# Patient Record
Sex: FEMALE | Race: WHITE | Employment: UNEMPLOYED | URBAN - METROPOLITAN AREA
[De-identification: names, ages, dates, MRNs, and addresses within clinical notes are randomized per-mention and may not be internally consistent; named-entity substitution may affect disease eponyms.]

---

## 2017-03-15 ENCOUNTER — HOSPITAL ENCOUNTER (OUTPATIENT)
Dept: LAB | Age: 77
Discharge: HOME | End: 2017-03-15
Payer: MEDICARE

## 2017-03-15 LAB
FERRITIN SERPL-MCNC: 7 NG/ML (ref 8–388)
FOLATE SERPL-MCNC: >20 NG/ML (ref 3.1–17.5)
IRON SATN MFR SERPL: 20 %
IRON SERPL-MCNC: 80 UG/DL (ref 50–175)
RETICS/RBC NFR AUTO: 0.8 % (ref 0.5–2.3)
TIBC SERPL-MCNC: 397 UG/DL (ref 250–450)
VIT B12 SERPL-MCNC: 363 PG/ML (ref 211–911)

## 2017-03-15 PROCEDURE — 82728 ASSAY OF FERRITIN: CPT | Performed by: FAMILY MEDICINE

## 2017-03-15 PROCEDURE — 82607 VITAMIN B-12: CPT | Performed by: FAMILY MEDICINE

## 2017-03-15 PROCEDURE — 36415 COLL VENOUS BLD VENIPUNCTURE: CPT | Performed by: FAMILY MEDICINE

## 2017-03-15 PROCEDURE — 83540 ASSAY OF IRON: CPT | Performed by: FAMILY MEDICINE

## 2017-03-15 PROCEDURE — 85045 AUTOMATED RETICULOCYTE COUNT: CPT | Performed by: FAMILY MEDICINE

## 2017-07-11 ENCOUNTER — HOSPITAL ENCOUNTER (OUTPATIENT)
Dept: MAMMOGRAPHY | Age: 77
Discharge: HOME | End: 2017-07-11
Attending: FAMILY MEDICINE
Payer: MEDICARE

## 2017-07-11 DIAGNOSIS — Z12.31 VISIT FOR SCREENING MAMMOGRAM: ICD-10-CM

## 2017-07-11 PROCEDURE — 77067 SCR MAMMO BI INCL CAD: CPT

## 2017-07-11 PROCEDURE — 77063 BREAST TOMOSYNTHESIS BI: CPT

## 2017-11-08 ENCOUNTER — HOSPITAL ENCOUNTER (OUTPATIENT)
Dept: LAB | Age: 77
Discharge: HOME | End: 2017-11-08
Payer: MEDICARE

## 2017-11-08 LAB
ALBUMIN SERPL-MCNC: 4 G/DL (ref 3.4–5)
ALBUMIN/GLOB SERPL: 1.3 {RATIO} (ref 0.8–1.7)
ALP SERPL-CCNC: 62 U/L (ref 45–117)
ALT SERPL-CCNC: 17 U/L (ref 13–56)
ANION GAP SERPL CALC-SCNC: 7 MMOL/L (ref 3–18)
AST SERPL-CCNC: 5 U/L (ref 15–37)
BASOPHILS # BLD: 0 K/UL (ref 0–0.1)
BASOPHILS NFR BLD: 1 % (ref 0–2)
BILIRUB SERPL-MCNC: 0.9 MG/DL (ref 0.2–1)
BUN SERPL-MCNC: 17 MG/DL (ref 7–18)
BUN/CREAT SERPL: 25 (ref 12–20)
CALCIUM SERPL-MCNC: 8.8 MG/DL (ref 8.5–10.1)
CHLORIDE SERPL-SCNC: 107 MMOL/L (ref 100–108)
CHOLEST SERPL-MCNC: 192 MG/DL
CO2 SERPL-SCNC: 29 MMOL/L (ref 21–32)
CREAT SERPL-MCNC: 0.69 MG/DL (ref 0.6–1.3)
DIFFERENTIAL METHOD BLD: ABNORMAL
EOSINOPHIL # BLD: 0.1 K/UL (ref 0–0.4)
EOSINOPHIL NFR BLD: 1 % (ref 0–5)
ERYTHROCYTE [DISTWIDTH] IN BLOOD BY AUTOMATED COUNT: 13.9 % (ref 11.6–14.5)
GLOBULIN SER CALC-MCNC: 3.1 G/DL (ref 2–4)
GLUCOSE SERPL-MCNC: 85 MG/DL (ref 74–99)
HCT VFR BLD AUTO: 36.9 % (ref 35–45)
HDLC SERPL-MCNC: 103 MG/DL (ref 40–60)
HDLC SERPL: 1.9 {RATIO} (ref 0–5)
HGB BLD-MCNC: 11.6 G/DL (ref 12–16)
LDLC SERPL CALC-MCNC: 66.8 MG/DL (ref 0–100)
LIPID PROFILE,FLP: ABNORMAL
LYMPHOCYTES # BLD: 1.3 K/UL (ref 0.9–3.6)
LYMPHOCYTES NFR BLD: 35 % (ref 21–52)
MCH RBC QN AUTO: 28 PG (ref 24–34)
MCHC RBC AUTO-ENTMCNC: 31.4 G/DL (ref 31–37)
MCV RBC AUTO: 88.9 FL (ref 74–97)
MONOCYTES # BLD: 0.2 K/UL (ref 0.05–1.2)
MONOCYTES NFR BLD: 6 % (ref 3–10)
NEUTS SEG # BLD: 2.1 K/UL (ref 1.8–8)
NEUTS SEG NFR BLD: 57 % (ref 40–73)
PLATELET # BLD AUTO: 236 K/UL (ref 135–420)
PMV BLD AUTO: 11.7 FL (ref 9.2–11.8)
POTASSIUM SERPL-SCNC: 4.2 MMOL/L (ref 3.5–5.5)
PROT SERPL-MCNC: 7.1 G/DL (ref 6.4–8.2)
RBC # BLD AUTO: 4.15 M/UL (ref 4.2–5.3)
SODIUM SERPL-SCNC: 143 MMOL/L (ref 136–145)
TRIGL SERPL-MCNC: 111 MG/DL (ref ?–150)
TSH SERPL DL<=0.05 MIU/L-ACNC: 2.07 UIU/ML (ref 0.36–3.74)
VLDLC SERPL CALC-MCNC: 22.2 MG/DL
WBC # BLD AUTO: 3.6 K/UL (ref 4.6–13.2)

## 2017-11-08 PROCEDURE — 80053 COMPREHEN METABOLIC PANEL: CPT | Performed by: FAMILY MEDICINE

## 2017-11-08 PROCEDURE — 85025 COMPLETE CBC W/AUTO DIFF WBC: CPT | Performed by: FAMILY MEDICINE

## 2017-11-08 PROCEDURE — 84443 ASSAY THYROID STIM HORMONE: CPT | Performed by: FAMILY MEDICINE

## 2017-11-08 PROCEDURE — 36415 COLL VENOUS BLD VENIPUNCTURE: CPT | Performed by: FAMILY MEDICINE

## 2017-11-08 PROCEDURE — 80061 LIPID PANEL: CPT | Performed by: FAMILY MEDICINE

## 2018-07-12 ENCOUNTER — HOSPITAL ENCOUNTER (OUTPATIENT)
Dept: MAMMOGRAPHY | Age: 78
Discharge: HOME | End: 2018-07-12
Attending: FAMILY MEDICINE
Payer: MEDICARE

## 2018-07-12 DIAGNOSIS — Z12.31 VISIT FOR SCREENING MAMMOGRAM: ICD-10-CM

## 2018-07-12 PROCEDURE — 77063 BREAST TOMOSYNTHESIS BI: CPT

## 2018-08-02 ENCOUNTER — HOSPITAL ENCOUNTER (OUTPATIENT)
Dept: LAB | Age: 78
Discharge: HOME | End: 2018-08-02
Payer: MEDICARE

## 2018-08-02 LAB
25(OH)D3 SERPL-MCNC: 41.2 NG/ML (ref 30–100)
ALBUMIN SERPL-MCNC: 3.7 G/DL (ref 3.4–5)
ALBUMIN/GLOB SERPL: 1.3 {RATIO} (ref 0.8–1.7)
ALP SERPL-CCNC: 51 U/L (ref 45–117)
ALT SERPL-CCNC: 16 U/L (ref 13–56)
ANION GAP SERPL CALC-SCNC: 5 MMOL/L (ref 3–18)
AST SERPL-CCNC: 8 U/L (ref 15–37)
BASOPHILS # BLD: 0 K/UL (ref 0–0.1)
BASOPHILS NFR BLD: 0 % (ref 0–2)
BILIRUB SERPL-MCNC: 0.9 MG/DL (ref 0.2–1)
BUN SERPL-MCNC: 12 MG/DL (ref 7–18)
BUN/CREAT SERPL: 17 (ref 12–20)
CALCIUM SERPL-MCNC: 8.5 MG/DL (ref 8.5–10.1)
CHLORIDE SERPL-SCNC: 110 MMOL/L (ref 100–108)
CHOLEST SERPL-MCNC: 155 MG/DL
CO2 SERPL-SCNC: 31 MMOL/L (ref 21–32)
CREAT SERPL-MCNC: 0.72 MG/DL (ref 0.6–1.3)
DIFFERENTIAL METHOD BLD: ABNORMAL
EOSINOPHIL # BLD: 0.1 K/UL (ref 0–0.4)
EOSINOPHIL NFR BLD: 2 % (ref 0–5)
ERYTHROCYTE [DISTWIDTH] IN BLOOD BY AUTOMATED COUNT: 13.6 % (ref 11.6–14.5)
GLOBULIN SER CALC-MCNC: 2.9 G/DL (ref 2–4)
GLUCOSE SERPL-MCNC: 83 MG/DL (ref 74–99)
HCT VFR BLD AUTO: 34.5 % (ref 35–45)
HDLC SERPL-MCNC: 76 MG/DL (ref 40–60)
HDLC SERPL: 2 {RATIO} (ref 0–5)
HGB BLD-MCNC: 11.1 G/DL (ref 12–16)
LDLC SERPL CALC-MCNC: 61.4 MG/DL (ref 0–100)
LIPID PROFILE,FLP: ABNORMAL
LYMPHOCYTES # BLD: 1.3 K/UL (ref 0.9–3.6)
LYMPHOCYTES NFR BLD: 37 % (ref 21–52)
MCH RBC QN AUTO: 28.5 PG (ref 24–34)
MCHC RBC AUTO-ENTMCNC: 32.2 G/DL (ref 31–37)
MCV RBC AUTO: 88.7 FL (ref 74–97)
MONOCYTES # BLD: 0.2 K/UL (ref 0.05–1.2)
MONOCYTES NFR BLD: 6 % (ref 3–10)
NEUTS SEG # BLD: 1.9 K/UL (ref 1.8–8)
NEUTS SEG NFR BLD: 55 % (ref 40–73)
PLATELET # BLD AUTO: 213 K/UL (ref 135–420)
PMV BLD AUTO: 11.3 FL (ref 9.2–11.8)
POTASSIUM SERPL-SCNC: 4.2 MMOL/L (ref 3.5–5.5)
PROT SERPL-MCNC: 6.6 G/DL (ref 6.4–8.2)
RBC # BLD AUTO: 3.89 M/UL (ref 4.2–5.3)
SODIUM SERPL-SCNC: 146 MMOL/L (ref 136–145)
TRIGL SERPL-MCNC: 88 MG/DL (ref ?–150)
TSH SERPL DL<=0.05 MIU/L-ACNC: 2.49 UIU/ML (ref 0.36–3.74)
VLDLC SERPL CALC-MCNC: 17.6 MG/DL
WBC # BLD AUTO: 3.5 K/UL (ref 4.6–13.2)

## 2018-08-02 PROCEDURE — 80061 LIPID PANEL: CPT | Performed by: FAMILY MEDICINE

## 2018-08-02 PROCEDURE — 82306 VITAMIN D 25 HYDROXY: CPT | Performed by: FAMILY MEDICINE

## 2018-08-02 PROCEDURE — 36415 COLL VENOUS BLD VENIPUNCTURE: CPT | Performed by: FAMILY MEDICINE

## 2018-08-02 PROCEDURE — 84443 ASSAY THYROID STIM HORMONE: CPT | Performed by: FAMILY MEDICINE

## 2018-08-02 PROCEDURE — 80053 COMPREHEN METABOLIC PANEL: CPT | Performed by: FAMILY MEDICINE

## 2018-08-02 PROCEDURE — 85025 COMPLETE CBC W/AUTO DIFF WBC: CPT | Performed by: FAMILY MEDICINE

## 2019-01-16 ENCOUNTER — HOSPITAL ENCOUNTER (OUTPATIENT)
Dept: LAB | Age: 79
Discharge: HOME | End: 2019-01-16
Payer: MEDICARE

## 2019-01-16 LAB
ALBUMIN SERPL-MCNC: 4 G/DL (ref 3.4–5)
ALBUMIN/GLOB SERPL: 1.5 {RATIO} (ref 0.8–1.7)
ALP SERPL-CCNC: 55 U/L (ref 45–117)
ALT SERPL-CCNC: 18 U/L (ref 13–56)
ANION GAP SERPL CALC-SCNC: 6 MMOL/L (ref 3–18)
AST SERPL-CCNC: 8 U/L (ref 15–37)
BILIRUB SERPL-MCNC: 1 MG/DL (ref 0.2–1)
BUN SERPL-MCNC: 15 MG/DL (ref 7–18)
BUN/CREAT SERPL: 21 (ref 12–20)
CALCIUM SERPL-MCNC: 8.5 MG/DL (ref 8.5–10.1)
CHLORIDE SERPL-SCNC: 109 MMOL/L (ref 100–108)
CO2 SERPL-SCNC: 28 MMOL/L (ref 21–32)
CREAT SERPL-MCNC: 0.7 MG/DL (ref 0.6–1.3)
GLOBULIN SER CALC-MCNC: 2.7 G/DL (ref 2–4)
GLUCOSE SERPL-MCNC: 86 MG/DL (ref 74–99)
POTASSIUM SERPL-SCNC: 4.2 MMOL/L (ref 3.5–5.5)
PROT SERPL-MCNC: 6.7 G/DL (ref 6.4–8.2)
SODIUM SERPL-SCNC: 143 MMOL/L (ref 136–145)

## 2019-01-16 PROCEDURE — 80053 COMPREHEN METABOLIC PANEL: CPT

## 2019-01-16 PROCEDURE — 36415 COLL VENOUS BLD VENIPUNCTURE: CPT

## 2019-02-12 ENCOUNTER — HOSPITAL ENCOUNTER (OUTPATIENT)
Dept: BONE DENSITY | Age: 79
Discharge: HOME | End: 2019-02-12
Attending: NURSE PRACTITIONER
Payer: MEDICARE

## 2019-02-12 DIAGNOSIS — M81.0 AGE-RELATED OSTEOPOROSIS WITHOUT CURRENT PATHOLOGICAL FRACTURE: ICD-10-CM

## 2019-02-12 PROCEDURE — 77080 DXA BONE DENSITY AXIAL: CPT

## 2019-07-15 ENCOUNTER — HOSPITAL ENCOUNTER (OUTPATIENT)
Dept: MAMMOGRAPHY | Age: 79
Discharge: HOME | End: 2019-07-15
Attending: FAMILY MEDICINE
Payer: MEDICARE

## 2019-07-15 DIAGNOSIS — Z12.39 BREAST SCREENING, UNSPECIFIED: ICD-10-CM

## 2019-07-15 PROCEDURE — 77067 SCR MAMMO BI INCL CAD: CPT

## 2019-07-30 ENCOUNTER — HOSPITAL ENCOUNTER (OUTPATIENT)
Dept: ULTRASOUND IMAGING | Age: 79
Discharge: HOME | End: 2019-07-30
Attending: FAMILY MEDICINE
Payer: MEDICARE

## 2019-07-30 ENCOUNTER — HOSPITAL ENCOUNTER (OUTPATIENT)
Dept: MAMMOGRAPHY | Age: 79
Discharge: HOME | End: 2019-07-30
Attending: FAMILY MEDICINE
Payer: MEDICARE

## 2019-07-30 DIAGNOSIS — R92.8 ABNORMAL MAMMOGRAM: ICD-10-CM

## 2019-07-30 PROCEDURE — 77066 DX MAMMO INCL CAD BI: CPT

## 2019-07-30 PROCEDURE — 77062 BREAST TOMOSYNTHESIS BI: CPT

## 2020-08-13 ENCOUNTER — HOSPITAL ENCOUNTER (OUTPATIENT)
Dept: MAMMOGRAPHY | Age: 80
Discharge: HOME | End: 2020-08-13
Attending: NURSE PRACTITIONER
Payer: MEDICARE

## 2020-08-13 DIAGNOSIS — Z12.31 VISIT FOR SCREENING MAMMOGRAM: ICD-10-CM

## 2020-08-13 PROCEDURE — 77063 BREAST TOMOSYNTHESIS BI: CPT

## 2020-12-02 ENCOUNTER — HOSPITAL ENCOUNTER (OUTPATIENT)
Dept: LAB | Age: 80
Discharge: HOME | End: 2020-12-02
Payer: MEDICARE

## 2020-12-02 LAB
ALBUMIN SERPL-MCNC: 3.8 G/DL (ref 3.4–5)
ALBUMIN/GLOB SERPL: 1.4 {RATIO} (ref 0.8–1.7)
ALP SERPL-CCNC: 44 U/L (ref 45–117)
ALT SERPL-CCNC: 15 U/L (ref 13–56)
ANION GAP SERPL CALC-SCNC: 6 MMOL/L (ref 3–18)
AST SERPL-CCNC: 7 U/L (ref 10–38)
BASOPHILS # BLD: 0 K/UL (ref 0–0.1)
BASOPHILS NFR BLD: 1 % (ref 0–2)
BILIRUB SERPL-MCNC: 1.1 MG/DL (ref 0.2–1)
BUN SERPL-MCNC: 15 MG/DL (ref 7–18)
BUN/CREAT SERPL: 19 (ref 12–20)
CALCIUM SERPL-MCNC: 8.4 MG/DL (ref 8.5–10.1)
CHLORIDE SERPL-SCNC: 111 MMOL/L (ref 100–111)
CHOLEST SERPL-MCNC: 169 MG/DL
CO2 SERPL-SCNC: 28 MMOL/L (ref 21–32)
CREAT SERPL-MCNC: 0.77 MG/DL (ref 0.6–1.3)
DIFFERENTIAL METHOD BLD: ABNORMAL
EOSINOPHIL # BLD: 0.1 K/UL (ref 0–0.4)
EOSINOPHIL NFR BLD: 2 % (ref 0–5)
ERYTHROCYTE [DISTWIDTH] IN BLOOD BY AUTOMATED COUNT: 13.9 % (ref 11.6–14.5)
GLOBULIN SER CALC-MCNC: 2.7 G/DL (ref 2–4)
GLUCOSE SERPL-MCNC: 83 MG/DL (ref 74–99)
HCT VFR BLD AUTO: 34.4 % (ref 35–45)
HDLC SERPL-MCNC: 83 MG/DL (ref 40–60)
HDLC SERPL: 2 {RATIO} (ref 0–5)
HGB BLD-MCNC: 10.8 G/DL (ref 12–16)
LDLC SERPL CALC-MCNC: 68.4 MG/DL (ref 0–100)
LIPID PROFILE,FLP: ABNORMAL
LYMPHOCYTES # BLD: 1.2 K/UL (ref 0.9–3.6)
LYMPHOCYTES NFR BLD: 31 % (ref 21–52)
MCH RBC QN AUTO: 28.3 PG (ref 24–34)
MCHC RBC AUTO-ENTMCNC: 31.4 G/DL (ref 31–37)
MCV RBC AUTO: 90.1 FL (ref 74–97)
MONOCYTES # BLD: 0.3 K/UL (ref 0.05–1.2)
MONOCYTES NFR BLD: 7 % (ref 3–10)
NEUTS SEG # BLD: 2.2 K/UL (ref 1.8–8)
NEUTS SEG NFR BLD: 59 % (ref 40–73)
PLATELET # BLD AUTO: 216 K/UL (ref 135–420)
PMV BLD AUTO: 11.4 FL (ref 9.2–11.8)
POTASSIUM SERPL-SCNC: 4.1 MMOL/L (ref 3.5–5.5)
PROT SERPL-MCNC: 6.5 G/DL (ref 6.4–8.2)
RBC # BLD AUTO: 3.82 M/UL (ref 4.2–5.3)
SODIUM SERPL-SCNC: 145 MMOL/L (ref 136–145)
TRIGL SERPL-MCNC: 88 MG/DL (ref ?–150)
TSH SERPL DL<=0.05 MIU/L-ACNC: 2.38 UIU/ML (ref 0.36–3.74)
VLDLC SERPL CALC-MCNC: 17.6 MG/DL
WBC # BLD AUTO: 3.8 K/UL (ref 4.6–13.2)

## 2020-12-02 PROCEDURE — 84443 ASSAY THYROID STIM HORMONE: CPT

## 2020-12-02 PROCEDURE — 80053 COMPREHEN METABOLIC PANEL: CPT

## 2020-12-02 PROCEDURE — 36415 COLL VENOUS BLD VENIPUNCTURE: CPT

## 2020-12-02 PROCEDURE — 85025 COMPLETE CBC W/AUTO DIFF WBC: CPT

## 2020-12-02 PROCEDURE — 80061 LIPID PANEL: CPT

## 2020-12-09 ENCOUNTER — HOSPITAL ENCOUNTER (OUTPATIENT)
Dept: LAB | Age: 80
Discharge: HOME | End: 2020-12-09
Payer: MEDICARE

## 2020-12-09 LAB
FERRITIN SERPL-MCNC: 27 NG/ML (ref 8–388)
FOLATE SERPL-MCNC: 19 NG/ML (ref 3.1–17.5)
IRON SERPL-MCNC: 76 UG/DL (ref 50–175)
TIBC SERPL-MCNC: 309 UG/DL (ref 250–450)
VIT B12 SERPL-MCNC: 314 PG/ML (ref 211–911)

## 2020-12-09 PROCEDURE — 36415 COLL VENOUS BLD VENIPUNCTURE: CPT

## 2020-12-09 PROCEDURE — 83540 ASSAY OF IRON: CPT

## 2020-12-09 PROCEDURE — 83550 IRON BINDING TEST: CPT

## 2020-12-09 PROCEDURE — 82607 VITAMIN B-12: CPT

## 2020-12-09 PROCEDURE — 82728 ASSAY OF FERRITIN: CPT

## 2021-07-14 ENCOUNTER — OFFICE VISIT (OUTPATIENT)
Dept: NEUROLOGY | Age: 81
End: 2021-07-14
Payer: MEDICARE

## 2021-07-14 DIAGNOSIS — R41.3 MEMORY LOSS: Primary | ICD-10-CM

## 2021-07-14 DIAGNOSIS — F22 DELUSIONS (HCC): ICD-10-CM

## 2021-07-14 DIAGNOSIS — R45.89 SYMPTOMS OF DEPRESSION: ICD-10-CM

## 2021-07-14 DIAGNOSIS — F41.8 ANXIETY ABOUT HEALTH: ICD-10-CM

## 2021-07-14 PROCEDURE — 90791 PSYCH DIAGNOSTIC EVALUATION: CPT | Performed by: PSYCHOLOGIST

## 2021-07-14 NOTE — PROGRESS NOTES
[facility]  Neuroscience   [address] [place] [address]. [facility], [address]  Office:  [phone]  Fax: [phone]                  Initial Office Exam  Patient Name: Nenita Martinez  Age: 80 y.o. Gender: female   Handedness: right handed   Presenting Concern: memory loss  Primary Care Physician: Rebecca Garcia MD  Referring Provider: Jennifer Barbour DNP      REASON FOR REFERRAL:  This comprehensive and medically necessary neuropsychological assessment was requested to assist a differential diagnosis of memory complaints. The use and purpose of this examination, as well as the extent and limitations of confidentiality, were explained prior to obtaining permission to participate. Instructions were provided regarding the necessity to put forth optimal effort and answer questions truthfully in order to obtain reliable and accurate test results. REVIEW OF RECORDS:  Ms. Boom Johnson was referred by neurology for work-up of memory loss which first noticeable a few months ago. She was recently on prednisone and experienced weakness, memory loss, and hallucinations. The symptoms have improved since the medication was discontinued. Headaches have also improved. History is significant for two aneurysms with coiling procedure for one. Some behavioral disturbance has been reported by Ms. Celeste Antunez  including visual hallucinations. Those have remitted. For example, she has installed cameras in the house because she believed her  was leaving at night. History is significant for Alzheimer's disease, hypertension, and hyperlipidemia. Medications were reviewed and include amlodipine besylate 2.5 mg, aspirin 81 mg, simvastatin 20 mg, Zetia 10 mg, and ergocalciferol 1.2 mg. An MRI of the brain on 2/16/15 showed following  1. No acute intracranial abnormality. Negative for acute infarct, acute  hemorrhage, or mass. 2. Cerebral volume loss.  Mild burden of nonspecific periventricular white matter  signal changes, likely mild chronic microvascular ischemic change. 3. Mild prominence of the lateral ventricles and fourth ventricle appears to be  related to cerebral volume loss and relative hypoplasia of the cerebellar  vermis.  -There is relatively mild prominence of the cerebral aqueduct and slightly  hyperdynamic flow signal through the aqueduct into the fourth ventricle. Findings are nonspecific, can be seen with normal pressure hydrocephalus. Please  correlate with any symptoms. If further imaging studies are needed, nuclear  medicine CSF flow study could provide additional information.       Past Medical History:   Diagnosis Date    Anemia     Carotid duplex 02/07/2015    Mild <50% bilateral ICA stenosis.  Endocrine disorder     Estrogen    GERD (gastroesophageal reflux disease)     History of echocardiogram 02/08/2015    EF 55-60%. No RWMA. Gr 1 DDfx. Mild LAE.  HLD (hyperlipidemia)     HTN (hypertension)     Menopause     Age 48    Osteoporosis     Syncope 2/7/15         Past Surgical History:   Procedure Laterality Date    HX CHOLECYSTECTOMY      HX CYST REMOVAL      HX HYSTERECTOMY  1967    HX OOPHORECTOMY  1999       CLINICAL INTERVIEW:  Ms. Eliel Coleman was accompanied by her daughter and  for the initial visit. Consistent with records, Ms. Em Shen daughter and  reported memory loss which first emerged a couple of months ago, coinciding with the initiation of prednisone. Though the medication has been discontinued, cognitive functioning continues to be a concern. Neurologic history is negative for seizures and significant head trauma. History is positive for aneurysm and syncope. Sleep complaints include snoring. EDS was denied. Pain complaints include headaches. These had been improving but worsened after the death of Ms. Em Shen dog last week.   Her family raised concerns about medications that have been recommended for headaches. They were encouraged to discuss this with the prescribing physician. There is no history of alcohol or illicit substance use. Tobacco use ceased in the 1970s. Family history is significant for stroke in the father. With regard to emotional functioning, Ms. Ilya Gauthier does not have a history of psychiatric hospitalization, suicidal ideation, self-harm behavior, or psychological trauma. Her family is however concerned that she may be exhibiting signs of depression secondary to her delusional beliefs. In particular, she has been verbalizing believes that her  will leave her and that he has plans to make of \"fast to get away\" due to the way he smith his car in the driveway. The family installed cameras in the house to assure her that he was not leaving during the night hours. These beliefs started prior to the initiation of prednisone but exacerbated during that time, including visual hallucinations of a party going on in the home. Current psychosocial stressors include the death of the family dog. Socially, Ms. Ilya Gauthier has been  for 60 years. She has 3 adult children. She lives with her . Academically, she completed 10 years of education and denied a history of LD. 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 Hobbies include gardening. Friend network is limited due to Covid. Functionally, Ms. Ilya Gauthier depends on her  for bill payment. He has always done this. He is also now supervising medication management. Ms. Ilya Gauthier has never driven.       MENTAL STATUS:    Sensorium  Awake, Aware, Alert   Orientation person, time/date, situation and month of year   Relations passive   Eye Contact appropriate   Appearance:  age appropriate   Motor Behavior:  gait unsteady   Speech:  monotone   Vocabulary average   Thought Process: within normal limits   Thought Content free of delusions and free of hallucinations   Suicidal ideations none   Homicidal ideations none   Mood:  euthymic   Affect:  mood-congruent   Memory recent  impaired   Memory remote:  adequate   Concentration:  impaired   Abstraction:  concrete   Insight:  fair   Reliability fair   Judgment:  limited         DIAGNOSTIC IMPRESSIONS:  1. Cognitive Decline: R/O Major Neurocognitive Disorder  2. S/O Depression  3. Delusions  4. Anxiety about health      PLAN:  1. Complete a comprehensive neuropsychological assessment to provide a differential diagnosis of presenting concerns as well as to assist with disposition and treatment planning as appropriate. 2. Consider compensatory and remedial cognitive training. 3. Consider nonpharmacological interventions for mood disorder. 4. Consider an adaptive driving evaluation. 5. Consider referral for elder health nurse to provide an in-home functional assessment. 6. Consider placement issues to provide greater structure and supervision to ensure safety, health and well-being. [id number] x 1 Review of records. Face to face interview w/ patient. Determine test protocol: 60 minutes. Total 1 unit      Giovani Leal, PHD  Licensed Clinical Psychologist    This note was created using voice recognition software. Despite editing, there may be syntax errors.

## 2021-07-15 ENCOUNTER — OFFICE VISIT (OUTPATIENT)
Dept: NEUROLOGY | Age: 81
End: 2021-07-15
Payer: MEDICARE

## 2021-07-15 DIAGNOSIS — F41.8 ANXIETY ABOUT HEALTH: ICD-10-CM

## 2021-07-15 DIAGNOSIS — F02.80 MIXED ALZHEIMER'S AND VASCULAR DEMENTIA (HCC): Primary | ICD-10-CM

## 2021-07-15 DIAGNOSIS — F01.50 MIXED ALZHEIMER'S AND VASCULAR DEMENTIA (HCC): Primary | ICD-10-CM

## 2021-07-15 DIAGNOSIS — G30.9 MIXED ALZHEIMER'S AND VASCULAR DEMENTIA (HCC): Primary | ICD-10-CM

## 2021-07-15 PROCEDURE — 96132 NRPSYC TST EVAL PHYS/QHP 1ST: CPT | Performed by: PSYCHOLOGIST

## 2021-07-15 PROCEDURE — 96136 PSYCL/NRPSYC TST PHY/QHP 1ST: CPT | Performed by: PSYCHOLOGIST

## 2021-07-15 PROCEDURE — 96138 PSYCL/NRPSYC TECH 1ST: CPT | Performed by: PSYCHOLOGIST

## 2021-07-15 PROCEDURE — 96137 PSYCL/NRPSYC TST PHY/QHP EA: CPT | Performed by: PSYCHOLOGIST

## 2021-07-15 PROCEDURE — 96139 PSYCL/NRPSYC TST TECH EA: CPT | Performed by: PSYCHOLOGIST

## 2021-07-15 PROCEDURE — 96133 NRPSYC TST EVAL PHYS/QHP EA: CPT | Performed by: PSYCHOLOGIST

## 2021-07-15 NOTE — PROGRESS NOTES
[facility]  Neuroscience   [address] [place] [address]. [facility], [address]  Office:  [phone]  Fax: [phone]                  Neuropsychological Evaluation Report    Patient Name: Algis Baumgarten  Age: 80 y.o. Gender: female   Handedness: right handed   Presenting Concern: memory loss  Primary Care Physician: Hank Godwin MD  Referring Provider: Finesse Hackett DNP    PATIENT HISTORY (OBTAINED DURING INITIAL CLINICAL EVALUATION):    REASON FOR REFERRAL:  This comprehensive and medically necessary neuropsychological assessment was requested to assist a differential diagnosis of memory complaints. The use and purpose of this examination, as well as the extent and limitations of confidentiality, were explained prior to obtaining permission to participate. Instructions were provided regarding the necessity to put forth optimal effort and answer questions truthfully in order to obtain reliable and accurate test results. REVIEW OF RECORDS:  Ms. Rashida Patel was referred by neurology for work-up of memory loss which first became noticeable a few months ago. She was recently on prednisone and experienced weakness, memory loss, and hallucinations. The symptoms have improved since the medication was discontinued. Headaches have also improved. History is significant for two aneurysms with coiling procedure for one. Some behavioral disturbance has been reported by Ms. Elle Carolina  including visual hallucinations. For example, she has installed cameras in the house because she believed her  was leaving at night. History is significant for suspected Alzheimer's disease, hypertension, and hyperlipidemia. Medications were reviewed and include amlodipine besylate 2.5 mg, aspirin 81 mg, simvastatin 20 mg, Zetia 10 mg, and ergocalciferol 1.2 mg. An MRI of the brain on 2/16/15 showed following  1. No acute intracranial abnormality.  Negative for acute infarct, acute  hemorrhage, or mass. 2. Cerebral volume loss. Mild burden of nonspecific periventricular white matter  signal changes, likely mild chronic microvascular ischemic change. 3. Mild prominence of the lateral ventricles and fourth ventricle appears to be  related to cerebral volume loss and relative hypoplasia of the cerebellar  vermis.  -There is relatively mild prominence of the cerebral aqueduct and slightly  hyperdynamic flow signal through the aqueduct into the fourth ventricle. Findings are nonspecific, can be seen with normal pressure hydrocephalus. Please  correlate with any symptoms. If further imaging studies are needed, nuclear  medicine CSF flow study could provide additional information.       Past Medical History:   Diagnosis Date    Anemia     Carotid duplex 02/07/2015    Mild <50% bilateral ICA stenosis.  Endocrine disorder     Estrogen    GERD (gastroesophageal reflux disease)     History of echocardiogram 02/08/2015    EF 55-60%. No RWMA. Gr 1 DDfx. Mild LAE.  HLD (hyperlipidemia)     HTN (hypertension)     Menopause     Age 48    Osteoporosis     Syncope 2/7/15       Past Surgical History:   Procedure Laterality Date    HX CHOLECYSTECTOMY      HX CYST REMOVAL      HX HYSTERECTOMY  1967    HX OOPHORECTOMY  1999       CLINICAL INTERVIEW:  Ms. Juanita Rizo was accompanied by her daughter and  for the initial visit. Consistent with records, Ms. Jolene Fernandez daughter and  reported memory loss which first emerged a couple of months ago, coinciding with the initiation of prednisone. Though the medication has been discontinued, cognitive functioning continues to be a concern. Neurologic history is negative for seizures and significant head trauma. History is positive for aneurysm and syncope. Sleep complaints include snoring. EDS was denied. Pain complaints include headaches. These had been improving but worsened after the death of Ms. Jolene Fernandez dog last week. Her family raised concerns about medications that have been recommended for headaches. They were encouraged to discuss this with the prescribing physician. There is no history of alcohol or illicit substance use. Tobacco use ceased in the 1970s. Family history is significant for stroke in the father. With regard to emotional functioning, Ms. Ilya Gauthier does not have a history of psychiatric hospitalization, suicidal ideation, self-harm behavior, or psychological trauma. Her family is however concerned that she may be exhibiting signs of depression secondary to her delusional beliefs. In particular, she has been verbalizing believes that her  will leave her and that he has plans to make a \"fast get away\" due to the way he smith his car in the driveway. The family installed cameras in the house to assure her that he was not leaving during the night hours. These beliefs started prior to the initiation of prednisone but exacerbated during that time. Other psychotic symptoms including visual hallucinations of a party going on in the home also occurred. Current psychosocial stressors include the death of the family dog. Socially, Ms. Ilya Gauthier has been  for 60 years. She has 3 adult children. She lives with her . Academically, she completed 10 years of education and denied a history of LD. 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 Hobbies include gardening. Friend network is limited due to Covid. Functionally, Ms. Ilya Gauthier depends on her  for bill payment. He has always done this. He is also now supervising medication management. Ms. Ilya Gauthier has never driven.       MENTAL STATUS:    Sensorium  Awake, Aware, Alert   Orientation person, time/date, situation and month of year   Relations passive   Eye Contact appropriate   Appearance:  age appropriate   Motor Behavior:  gait unsteady   Speech:  monotone   Vocabulary average   Thought Process: within normal limits Thought Content free of delusions and free of hallucinations   Suicidal ideations none   Homicidal ideations none   Mood:  euthymic   Affect:  mood-congruent   Memory recent  impaired   Memory remote:  adequate   Concentration:  impaired   Abstraction:  concrete   Insight:  fair   Reliability fair   Judgment:  limited     METHODS OF ASSESSMENT (Current Evaluation):  Clinician Administered: Adaptive Behavior Assessment System (ABAS-3)  Clinical Assessment of Geriatric Emotional Status (CASE)  Clock Drawing Task  Geriatric Depression Scale: Short Form (GDS)  Modified Mini-Mental Status Exam (3MS)  Test of Premorbid Functioning (TOPF)    Technician Administered Alfred Galdamez CSP): Animals (category fluency)  Controlled Oral Word Association Test  Neuropsychological Assessment Battery-Select Subtests  RBANS-A-select subtests  [place] Functional Living Scale  Trailmaking Test    TEST OBSERVATIONS:  Ms. Ronnie Love arrived promptly for the testing session. Dress and grooming were appropriate; physical presentation was unchanged from that observed during the clinical interview. Speech was fluent and coherent with normal rate, rhythm, tone, and volume. No expressive or receptive language deficits were noted. No unusual behaviors or psychomotor abnormalities were observed. Thought process was logical. Thought content showed no apparent delusional ideation. Auditory and visual hallucinations were denied and there was no obvious response to internal stimuli. Affect was congruent with the euthymic mood conveyed. Ms. Ronnie Love was adequately cooperative and appeared to put forth good effort throughout this examination. However, she did report a headache. Rapport with the examiner was adequately established and maintained. Minimal prompting was required. Given the above observations, plus comments contained in the Mental Status section, the results of this examination are regarded as reasonably reliable and valid.     TEST RESULTS:  Quantitative test results are derived from comparisons to age and education corrected cohort normative data, where applicable. Percentiles are included in these instances. Qualitative test results are determined using clinical observations. General Orientation and Awareness:       Orientation to person yes   Time no  Place yes  Circumstance yes                     Sensory-Perceptual and Motor Functioning:    Visual and auditory acuity:  Ms. Manuel Pryor insisted on wearing sunglasses during her testing, insisting that they were reading glasses      Gait and balance:   Shuffling gait                 Cognitive Screening: On the Modified Mini-Mental Status Exam, Ms. Manuel Pryor obtained a severely impaired score. Difficulties were noted in the following areas: Mental reversal, immediate and delayed spontaneous memory, orientation (misidentified the year, season, date, and day of the week), abstract reasoning, and figure copy. Clock drawing was WNL.     Attention/Concentration:       Simple visuomotor tracking (<1 percentile)                    Severely Impaired  Digits forward (34 percentile)                      Average  Digits backward (8 percentile)           Mildly Impaired        Visual scanning (2 percentile)                          Moderately Impaired    Visuospatial and Constructional Praxis:     Visual discrimination (8 percentile)                               Mildly Impaired     Language:             Phonemic verbal fluency (<1 percentile)                               Severely Impaired   Categorical verbal fluency (2 percentile)                       Moderately Impaired  Auditory comprehension (18 percentile)                             Low Average   Naming (1 percentile)            Severely Impaired    Memory and Learning:       Word list immediate recall (8 percentile)                 Mildly Impaired  Word list delayed recall (10 percentile)                 Low Average  Forced Choice Recognition (2 percentile)     Moderately Impaired  Shape learning immediate recognition (73 percentile)    Average    Shape learning delayed recognition (12 percentile)               Low Average  Forced Choice Recognition (75 percentile)     Above Average  Story learning immediate recall (37 percentile)     Average  Story learning delayed recall (10 percentile)                           Low Average    Cognitive Tests of Executive Functioning:     Ability to think flexibly, Trailmaking B (Discontinued due to confusion)    Intellectual and Basic Cognitive Functioning:  ACS Test of pre-morbid functioning reading recognition lower limit estimated WAIS-IV FSIQ score:       Estimated full scale IQ: 82   Percentile: 12     Rating: Low Average    Adaptive Behavior Measure for Independent Living [facility] Functional Living Scale):  Time                 51-75 percentile  Average  Money and calculation   <2 percentile   Severely Impaired  Communication     3-9 percentile  Mildly to Moderately Impaired  Memory      3-9 percentile  Mildly to Moderately  Impaired  Total                 4 percentile   Moderately Impaired    Adaptive Behavior (Adaptive Behavior Assessment System)  General Adaptive Composite:  78   Percentile: 7 Mildly Impaired   Conceptual:  68    Percentile: 2 Moderately Impaired   Social:  75    Percentile: 5 Mildly Impaired   Practical:  86    Percentile: 18 Low Average    Emotional Functioning:  Screening of Emotional/Psychiatric Status:  Level of self-reported anxiety  Percentile: 73    Mild Range   Somatic   Percentile: 50  Mild Range   Cognitive   Percentile: 88  Moderate Range   Affective   Percentile: 70  Mild Range  Level of self-reported depression  (05/15)  Mild Range    On a measure of general emotional functioning completed by Ms. Sunil Tran daughter, she reported observing the following: Anxiety, cognitive deficits, depressed mood, agitation/irritability, repetitive thoughts and behavior, paranoid thinking, and aberrant behavior. IMPRESSIONS/RECOMMENDATIONS:  Test performance was consistent with a mixed dementia of at least moderate severity. Deficiencies were diffuse and noted in the following areas: Visual attention, working memory, cognitive flexibility visual discrimination, verbal fluency, confrontational naming, and verbal/visual memory. Though Alzheimer's pathology may be involved, I suspect a more prominent vascular/disexecutive syndrome. Nevertheless, pharmacotherapy for cognitive efficiency may be beneficial and is deferred to the referring provider. Unfortunately, neuropsychiatric features are prominent part of this clinical picture. Although the symptoms exacerbated in the presence of steroid treatment, I believe they are secondary to Ms. Tori Saleh dementia. Pharmacotherapy is suggested as are nonpharmacologic interventions. With regard to the latter, Ms. Tori Saleh family is encouraged to consider the general recommendations below and to connect with the local chapter of the Alzheimer's Association for education and support. More information can be found at: http://www.[employer]/    Due to the functional impairment associated with Ms. Tori Saleh cognitive decline, assistance for IADLs is encouraged. Fortunately, family already assists with medication management and bill payment. I would recommend that they also engage in advanced care planning to include the designation of a guardian as Ms. Jeromy Monsivais does not appear to possess capacity for medical or financial decision-making, voting, marrying, or owning a firearm. Persistent headaches are an important clinical target. Traditional treatment is encouraged. A sleep study to rule out RAJEEV might also be beneficial. In addition to headaches, snoring was also reported. DIAGNOSTIC IMPRESSIONS:  1. Mixed Alzheimer's and vascular Dementia, with behavioral disturbance (anxiety, dysphoric mood, agitation, paranoia, psychosis)  2. Anxiety about health    GENERAL RECOMMENDATIONS:  · When planning daily activities, consider the following:  · Focus on abilities rather than on limitations as much as possible. Caregiver should give honest praise and encouragement. · A predictable routine is important. It decreases anxiety and reduce his need to adapt to change  · Break down tasks into simple steps. Provide one step at a time instructions or cues and use short words and simple sentences  · Except that each daily task will gradually take more time to do as the disease progresses  · Encourage activities that have little risk of failure. Give credit for trying rather than for completing a task  · Reduce distractions such as background noise to improve the ability to concentrate on task  · Allow the person to make choices to improve self-esteem. Confusion, anxiety, or agitation in response to a choice or clues that making that particular decision may be too difficult  · People with dementia are highly sensitive to the moods of people around him. Caregiver should try to remain calm and patient and avoid criticism. · Early fatigue and short attention span are common in individuals with dementing diseases. When possible, plan activities at the best time of day and allow time for rest.     · Individuals with dementia symptoms often have heightened sensitivities and can therefore be more vulnerable to distress and agitation. Creating a calm home will be important; the following are recommended:  · If reflections seem to call cause visual disturbance, cover reflective surfaces such as mirrors, windows, or glass tables or shiny appliances. · A flickering television can be disturbing, or could even induce hallucinations. · Reduce background noise such as radio or television. This may lessen confusion or anxiety and also increase the ability to concentrate.   · Limit the number of activities or visitors if they are causing fatigue or agitation  · Allow plenty of time to engage in everyday tasks without rushing. This may mean that to get to the doctor's appointment on time, you may need to begin to get ready to hours beforehand  · Simplify the living areas of the home if too much clutter is confusing    · Plan ahead for caregiver emergencies  · Make arrangements for others to assume care at a moments notice: Family, friend, neighbor, home health care agency offering 24-hour care, group home, or nursing home  · Post emergency information and a prominent place such as the refrigerator door or next to the phone. Include: Family names, addresses, work and home phone numbers, whom to contact in an emergency, whom you have selected to make healthcare decisions for you if you are incapacitated, doctor's name and phone number (including weekend and evenings), name and address of preferred hospital, insurance names and policy numbers, the list of medications, schedules, and where they are kept for both the person with dementia and the caregiver     Currently, there is a deficit in optimal levels of behavior and social activation. Therefore,  programming is recommended (e.g. [employer] [facility] program)    · Fall prevention  · Use nonskid wax or vinyl floors  · Remove any throw rugs are fast and then to the floor  · Use nonskid surfaces such as textured strips or decals in the bathtub or shower  · Install grab bars near the toilet and in the bath. Consider a raised toilet seat, shower stool, and a hand-held shower  · Make sure hallways and stairways are well lit and free of clutter  · Use a night light in the back bedroom and/or bathroom  · Install secure railings on all stairs and marked the edges of stairs with brightly colored tape.   Install Starks on the top of stairs if needed  · Remove any tripping hazards such as extension cords  · Encourage the use of sturdy chairs that have high seats and strong arms  · Make sure the person with dementia wear shoes that are comfortable and safe  · Correct or treat other health problems that may increase the risk of falling such as hearing and vision loss or arthritis  · Closely monitor medication use, especially those that could cause dizziness or confusion. Check with the doctor immediately if you suspect a medication reaction  · Consider the use of an emergency alarm to alert others if there is a fall    · Fire hazards  · Make sure there is a smoke alarm that works. If possible, connected to the apartment office or fire station so that someone in addition to the person with dementia will be alerted if it goes off  · Explore the possibility of a microwave. If the person with Alzheimer's disease already knows how to operate a microwave or can learn to do so, remove the knobs of the regular stove or disconnected  · Limit the amount of cooking by bringing and prepared meals, hiring a homemaker, or arranging for home delivered meals  · Call the fire department to see whether there is a program for registering persons with disabilities so that in case of a fire they would be rescued first  · Allow smoking only in one area and only with supervision. Lock up all smoking materials when not in use    · Creating a safe home  · Remove or lock up sharp or dangerous kitchen utensils such as knives, Food processors, mixers, toaster ovens, and coffee makers  · Remove or lockup sharp outside equipment such as lawnmowers, chain saws, snowblowers, gasoline containers, ladders, boats, and farm implements  · Remove or lock up weapons, especially guns  · Remove or lock up items in the basement such as electric power tools or paint  · Remove or lock up poison such as cleaning supplies, insecticides, or poisonous plants  · Remove or lock up alcohol and drugs  · Remove objects that may be eaten such as buttons, pins, etc.      Thank you for allowing me the opportunity to assist you in MsEvelyn  Moi Hustler care.  Please do not hesitate to contact me should you have additional questions that I may not have addressed. 11091 x 1  96137 x 1  96138 x 1  96139 x 4  96132 x 1  96133 x 1    [facility] Sara Martell, Ph.D.   Licensed Clinical Psychologist        Time Documentation:     60351 x 1   76719 x 1 Neuropsych testing/data gathering by Neuropsychologist: (1 hour). 47787 x1 (first 30 minutes), 96137 x 1 (additional 30 minutes)     96138 x 1  96139 x 4 Test Administration/Data Gathering By Technician: (2.5 hours). 32155 x 1 (first 30 minutes), 96139 x 4 (each additional 30 minutes)     96132 x 1  96133 x 1 Testing Evaluation Services by Neuropsychologist (1 hour, 50 minutes), 78922 x1 (first hour), 45849 x1 (additional 50 minutes)     The above includes: Record review. Review of history provided by patient. Review of collaborative information. Testing by Clinician. Review of raw data. Scoring. Report writing of individual tests administered by Clinician. Integration of individual tests administered by psychometrist with NSE/testing by clinician, review of records/history/collaborative information, case Conceptualization, treatment planning, clinical decision making, report writing, coordination Of Care. Psychometry test codes as time spent by psychometrist administering and scoring neurocognitive/psychological tests under supervision of neuropsychologist.  Integral services including scoring of raw data, data interpretation, case conceptualization, report writing etcetera were initiated after the patient finished testing/raw data collected and was completed on the date the report was signed. This note will not be viewable in [address] This note was created using voice recognition software. Despite editing, there may be syntax errors. I have reviewed the documentation provided by Dr. Elmer Chin, PhD, Saumya Herring. Dr. Sara Martell is in her second year of Fellowship in Clinical Neuropsychology.    Dr. Sara Martell is licensed and credentialed to practice in the [address] [place] [address], is providing the current services via her NPI and licensure, and had been providing similar services prior to her employment with [employer] [facility]. No additional insurance billing is done by me on her cases, my NPI is not being used, etc.   I have not had any face to face engagement with her patients, and am providing supervision and consultation services with her as she works towards advancing her career and subspecialities. I have reviewed the history, the neurocognitive and psychological test results, the medical records available, and the impressions and recommendations generated by Dr. SAINT-DIÉ. We have engaged in peer to peer supervision as needed. I have reviewed history noted in the records, the tests administered, the test scores, and the overall case history and profile and report generated by Dr. SAINT-DIÉ. Dr. Suad Martinez clinical case formulation, diagnostic impressions, and the proposed management plans/treatment recommendations are her own and based on her clinical training, level of expertise, and judgment. Any additional comments, concerns, or recommendations that I am making are offered here: This is a moderate dementia of mixed Alzheimer's and vascular pathology. I agree with the supervision as noted above, as well as consider medication for memory monitor emotional and address emotional/mood issues over time as needed. Increase supervision will be required as needed. David Jama, JAZIEL  Neuropsychology

## 2021-07-16 ENCOUNTER — TELEPHONE (OUTPATIENT)
Dept: NEUROLOGY | Age: 81
End: 2021-07-16

## 2021-07-26 ENCOUNTER — TRANSCRIBE ORDER (OUTPATIENT)
Dept: SCHEDULING | Age: 81
End: 2021-07-26

## 2021-07-26 DIAGNOSIS — Z12.31 VISIT FOR SCREENING MAMMOGRAM: Primary | ICD-10-CM

## 2021-08-16 ENCOUNTER — HOSPITAL ENCOUNTER (OUTPATIENT)
Dept: MAMMOGRAPHY | Age: 81
Discharge: HOME | End: 2021-08-16
Attending: NURSE PRACTITIONER
Payer: MEDICARE

## 2021-08-16 DIAGNOSIS — Z12.31 VISIT FOR SCREENING MAMMOGRAM: ICD-10-CM

## 2021-08-16 PROCEDURE — 77063 BREAST TOMOSYNTHESIS BI: CPT

## 2022-09-26 ENCOUNTER — TRANSCRIBE ORDER (OUTPATIENT)
Dept: SCHEDULING | Age: 82
End: 2022-09-26

## 2022-09-26 DIAGNOSIS — Z12.31 VISIT FOR SCREENING MAMMOGRAM: Primary | ICD-10-CM

## 2023-01-31 DIAGNOSIS — Z12.31 VISIT FOR SCREENING MAMMOGRAM: Primary | ICD-10-CM

## 2023-02-04 DIAGNOSIS — Z12.31 VISIT FOR SCREENING MAMMOGRAM: Primary | ICD-10-CM
